# Patient Record
(demographics unavailable — no encounter records)

---

## 2024-10-14 NOTE — HISTORY OF PRESENT ILLNESS
[de-identified] : female born at [facility] at 28weeks with MED13L syndrome, ASD hypotonia, who had a cardiopulmonary arrest 7/25/24, she is vent dependent via Trach ( placed 8/24) and GT feeds, currently resides at [facility].  Patient also has HBSC parents are here with [facility] staff to establish care at the [facility] Pt has had 3 hospitalizations 1. Cardiopulmonary arrest 2. Aspiration Pneumonia 3. 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 Pt HgB today 11.7g/dl with recent transfusion due to recent admission was d/c 3 days ago  Previous hospitalization on [facility] several week ago for respiratory distress (required increase in vent settings and s/p cefepime for pseudomonas tracheitis) was transferred from [place] due to elevated BPs (160s/80s). She was given her nightly dose of amlodipine with temporary improvement, but with increase in BP later. No fevers, vomiting, diarrhea, or URI sxs reported. PICU Course (10/8 - 10/11): RESP: Patient remained on SIMV PS 10, PIP 21/6 RR 25.  Patient received albuterol q4h, which was spaced to q6h before discharge.  She received HTS nebs q8h and IPV q6h/  She received her home medication of budesonide 0.5mg BID. Patient was initially on glycopyrrolate 0.14 BID, but this was held due to thick secretions. CV/NEPHRO: Pt was transitioned off nicardipine gtt, and was evaluated by nephrology and recommended to start on propranolol 10mg TID due to concern for dysautonomia as a contributor of HTN.  However, patient had multiple episodes of bradycardia, so propranolol was discontinued.  They also recommended hydralazine q6h prn for BPs > 105/65 (95th percentile). Nephrology recommended increasing amlodipine to 2mg BID, in vikash of propranolol due to concerns of bradycardia. Patient received clonidine patch 0.1mg, frequency of which was changed from q7days to q5days per nephrology recommendations. Per echo on 9/19, she has IVC and aortic arch branching needing to be evaluated in future study. NEURO: Due to concerns of facial twitching and alterations to baseline status, and given Hx HbSC, head CT non-con performed which showed increased subdural fluid collections, potentially chronic in nature. This head CT was compared with prior head CT that family brought from outside hospital.  Findings were as follows: Evolution of atrophic changes compared with 8/3/2024 despite the presence of large bilateral subdural predominantly fluid attenuation collections. On the prior there was an acute interhemispheric subdural hematoma that has resolved. Evolution of large bilateral subdural predominantly fluid attenuation collections not seen on the prior study. Evolution of bilateral high attenuation regions at the level of the basal ganglia to a more lucent appearance compared to prior suggesting prior anoxic injury.  Patient was continued on home neuro meds including the following: Gabapentin 85mg TID, baclofen 2.5mg TID, amantadine 23mg BID.  Head circumferences were measured every shift.  PMN&R was consulted for hypertonia, and they changed some dosages of home medications, which were started on 10/10 and included the following alterations: amantadine 40mg q7am, and 25mg q12pm, as well as baclofen 2.5mg in the AM, 5mg in the afternoon, and 5mg in the evening. **Per PM&R, If pt has worsening of vital signs due to increase of amantadine please scale back to 25mg Once a day. FENGI: Patient continued home tube feeds: Elecare 20kca/oz at 32cc/h + 10cc FWF q4h + 1oz prune juice (via J-tube).  Patient received home medication of pepcid 4mg BID, simethicone 20mg q6h prn. ID: Patient initially received prophylactic amoxicillin 17.35mg/kg qd, which was changed to 10mg/kg BID per pharmacy recommendations.   Labs/Imaging: Hemoglobin Electrophoresis (10.08.24 @ 01:48) Hemoglobin A%: 30.3 %  Hemoglobin A2%: 3.2 % Hemoglobin F%: 5.8 % Hemoglobin S%: 30.1 % Hemoglobin C%: 30.6 % Hemoglobin Interpretation: Known hemoglobin S/C disease.  CT Brain 10/8/2024: IMPRESSION: Evolution of atrophic changes compared with 8/3/2024 despite the presence of large bilateral subdural predominantly fluid attenuation collections. On the prior there was an acute interhemispheric subdural hematoma that has resolved. Evolution of large bilateral subdural predominantly fluid attenuation collections not seen on the prior study. Evolution of bilateral high attenuation regions at the level of the basal ganglia to a more lucent appearance compared to prior suggesting prior anoxic injury.  MRI Brain w/o contrast 10/10/2024: No evidence for acute infarct or acute hemorrhage. Large chronic subdural collections are again noted adjacent to both cerebral hemispheres, stable in size compared to the 10/08/2024 head CT. No superimposed acute subdural hemorrhage is present. The large chronic subdural collections are new compared to the 08/03/2024 outside head CT study. Serial imaging follow-up over time is recommended to monitor for stability. Nonspecific ventriculomegaly of the lateral and third ventricles is stable compared to the 10/08/2024 head CT. Serial imaging follow-up over time is recommended to monitor for stability. Cystic encephalomalacia involves the bilateral basal ganglia consistent with sequela from previous anoxic ischemic insult given the known history of cardiac arrest. The T1 signal of the neurohypophysis is not well visualized. Correlate for possible pituitary dysfunction.

## 2024-10-18 NOTE — PHYSICAL EXAM
[Normal] : soft; non- distended; non-tender; no hepatosplenomegaly or masses [de-identified] : eyes closed [de-identified] : trach to vent [de-identified] : GT c/d/i [de-identified] : contractures in all extremities [de-identified] : no purposeful gaze or movements, hypertonic

## 2024-10-18 NOTE — REASON FOR VISIT
[Initial Evaluation] : an initial evaluation of [Hypertension] : ~T hypertension [Medical Records] : medical records [Other: _____] : [unfilled]

## 2024-10-18 NOTE — DATA REVIEWED
[FreeTextEntry1] : Labs 10/10: Na 140, K 3.5, Cl 106, bicarb 23, BUN 3, Cr <0.2 Labs 9/24: Aldosteron 18.8 (normal) Renin direct 10.7 (normal) Renin activity 3.9 (normal) TSH 3.1 (normal) fT4 10.29 (normal)  Echo 9/19/24:   Summary:  1. Technically limited imaging quality secondary to patient agitation and poor acoustic windows.  2. Small-to-moderate secundum atrial septal defect with left to right shunting.  3. Based on ventricular septal configuration, estimated RVp is more than half of systemic; however, it can't be quantified.  4. Mild-to-moderately dilated right ventricle with normal systolic function.  5. Normal left ventricular size, morphology and systolic function.  6. No pericardial effusion.  7. IVC and aortic arch sidedness/branching need to be evaluated in future study.  Renal doppler US 9/17/24: FINDINGS: Right kidney: 5.8 cm. No renal mass, hydronephrosis or calculi. Left kidney: 6.1 cm. No renal mass, hydronephrosis or calculi.  Urinary bladder: Within normal limits.  Color and spectral Doppler reveals normal, symmetric blood flow  throughout both kidneys. Peak aortic velocity is 84 cm/sec. IVC/Renal Veins: Patent.  RIGHT Renal Artery: Peak systolic velocity is not measured at its origin, 80.3 cm/sec  proximal, 87.2 cm/sec mid, 76.0 cm/sec distal and 38.5 cm/sec hilum. Upper Segmental Artery:  RI = 0.68 Middle Segmental Artery: RI = 0.65 Lower Segmental Artery: RI = 0.66  LEFT Renal Artery: Peak systolic velocity is now measured at its origin, 64.1 cm/sec  proximal, 84.1 cm/sec mid, 80.8 cm/sec distal and 54.1 cm/sec hilum. Upper Segmental Artery:  RI = 0.69 Middle Segmental Artery: RI = 0.64 Lower Segmental Artery: RI = 0.64  IMPRESSION:  No evidence of a significant renal artery stenosis.

## 2024-10-18 NOTE — CONSULT LETTER
[Consult Letter:] : I had the pleasure of evaluating your patient, [unfilled]. [Please see my note below.] : Please see my note below. [Consult Closing:] : Thank you very much for allowing me to participate in the care of this patient.  If you have any questions, please do not hesitate to contact me. [Sincerely,] : Sincerely, [FreeTextEntry3] : Lucila Carlos MD Pediatric Nephrologist [facility] [phone]